# Patient Record
(demographics unavailable — no encounter records)

---

## 2018-12-30 NOTE — PR
[facility]
                                    [address]
                                  [place], [place]  [address]
_________________________________________________________________________________________
                                                                 Signed   
 
 
===================================
Progress Notes IP
===================================
Datetime Report Generated by CPN: 12/30/2018 22:15
   
PROGRESS NOTES:  [id number]
Impression:  Normal progression of labor
Procedures:  Artificial ROM; Sterile Vag Exam
Plan:  Anesthesia consult
Informed Consent Obtain:  Vaginal Delivery; Risks, Benefits and Alternatives Discussed
VITAL SIGNS:  [id number]
Vital Signs:  Reviewed; Within Normal Limits
EXAM:  [id number]
Dilatation:  5.0
Effacement:  100
Station:  -2
Uterine Contractions:  q 1 to 3 min
MEMBRANES:  [id number]
Membrane Status:  Intact
ROM Note:  AROM with moderate amount meconium stained fluid
Comments:  Progressing.  Epidural now.
Fetus A:  [id number]
FHR Baseline:  155
Variability:  Moderate 6-25bpm
Accelerations:  15X15
Decelerations:  None
FHR Category:  Category I
Presentation:  Vertex
Comments on Fetus A:  No evidence of fetal metabolic acidosis.
Fetus B:  [id number]
Signing Physician:  Tamanna Ruth MD
 
 
Copies:                                
~
 
 
 
 
 
 
 
 
*Electronically Signed*  12/30/18  2215  TAMANNA RUTH MD            
                                                                       
_________________________________________________________________________________________
PATIENT NAME:     OKSANA PEREZ                       
MEDICAL RECORD #: [id number]                     PROGRESS NOTE                 
          ACCT #: [id number]  
DATE OF BIRTH:   09/28/97                                       
PHYSICIAN:   TAMANNA RUTH MD                   RPT #: [id number]
REPORT IS CONFIDENTIAL AND NOT TO BE RELEASED WITHOUT AUTHORIZATION

## 2018-12-31 NOTE — PR
[facility]
                                    [address]
                                  [place], [place]  [address]
_________________________________________________________________________________________
                                                                 Signed   
 
 
===================================
PP Progress Notes
===================================
Datetime Report Generated by CPN: 2018 12:43
   
SUBJECTIVE:  [id number]
Pain:  Within normal limits
Vital Signs:  [id number]
Vital Signs:  Reviewed; Within Normal Limits
POSTPARTUM EXAM:  [id number]
Cardiovascular:  Not Done
Respiratory:  Not Done
Abdomen/Uterus:  Abnormal
Lochia:  Normal
Vulva/Perineum:  Abnormal
Breasts:  Not Done
CVA Tenderness:  Not Done
Extremities:  Normal
 Incision:  Not Applicable
Breastfeeding Progress:  Normal
Exam Comments:  Fundus firm, NT @ U.  Perineum with mild edema only.
IMPRESSION/PLAN/PROCEDURES:  [id number]
Impression:  Normal postpartum progression
Progress Notes:  Doing well.  Her perineal swelling has decreased and I think peraza can
   be removed.
Signing Physician:  Tamanna Ruth MD
 
 
Copies:                                
~
 
 
 
 
 
 
 
 
 
 
 
 
 
*Electronically Signed*  18  1243  TAMANNA RUTH MD            
                                                                       
_________________________________________________________________________________________
PATIENT NAME:     OKSANA PEREZ                       
MEDICAL RECORD #: [id number]                     PROGRESS NOTE                 
          ACCT #: [id number]  
DATE OF BIRTH:   97                                       
PHYSICIAN:   TAMANNA RUTH MD                   RPT #: [id number]
REPORT IS CONFIDENTIAL AND NOT TO BE RELEASED WITHOUT AUTHORIZATION

## 2019-01-01 NOTE — PR
[facility]
                                    [address]
                                  [place], [place]  [address]
_________________________________________________________________________________________
                                                                 Signed   
 
 
===================================
PP Progress Notes
===================================
Datetime Report Generated by CPN: 2019 09:39
   
SUBJECTIVE:  [id number]
Pain:  Within normal limits
Pain Comments:  more perineal swelling today
Vital Signs:  [id number]
Vital Signs:  Reviewed; Within Normal Limits
POSTPARTUM EXAM:  [id number]
Cardiovascular:  Not Done
Respiratory:  Not Done
Abdomen/Uterus:  Abnormal
Lochia:  Normal
Vulva/Perineum:  Abnormal
Breasts:  Not Done
CVA Tenderness:  Not Done
Extremities:  Normal
 Incision:  Not Applicable
Breastfeeding Progress:  Normal
Exam Comments:  Fundus firm, NT @ U-1.
   Perineum with moderate edema
      
   H/H 9.2/27.5, WBC 12.8, plat 218k
IMPRESSION/PLAN/PROCEDURES:  [id number]
Impression:  Normal postpartum progression
Other Impression:  perineal swelling
Plan:  Discharge
Procedures:  None
Progress Notes:  Doing well though increased perineal edema.  She is tolerating this
   well.  I do think she is stable for D/C.
Signing Physician:  Tamanna Ruth MD
 
 
Copies:                                
~
 
 
 
 
 
 
*Electronically Signed*  19  0939  TAMANNA RUTH MD            
                                                                       
_________________________________________________________________________________________
PATIENT NAME:     OKSANA PEREZ                       
MEDICAL RECORD #: [id number]                     PROGRESS NOTE                 
          ACCT #: [id number]  
DATE OF BIRTH:   97                                       
PHYSICIAN:   TAMANNA RUTH MD                   RPT #: [id number]
REPORT IS CONFIDENTIAL AND NOT TO BE RELEASED WITHOUT AUTHORIZATION